# Patient Record
Sex: MALE | Race: BLACK OR AFRICAN AMERICAN | NOT HISPANIC OR LATINO | URBAN - METROPOLITAN AREA
[De-identification: names, ages, dates, MRNs, and addresses within clinical notes are randomized per-mention and may not be internally consistent; named-entity substitution may affect disease eponyms.]

---

## 2020-12-18 ENCOUNTER — HOSPITAL ENCOUNTER (OUTPATIENT)
Dept: LAB | Facility: MEDICAL CENTER | Age: 55
End: 2020-12-18
Payer: COMMERCIAL

## 2020-12-18 LAB
COVID ORDER STATUS COVID19: NORMAL
SARS-COV-2 RNA RESP QL NAA+PROBE: DETECTED
SPECIMEN SOURCE: ABNORMAL

## 2021-01-13 ENCOUNTER — HOSPITAL ENCOUNTER (OUTPATIENT)
Dept: LAB | Facility: MEDICAL CENTER | Age: 56
End: 2021-01-13
Payer: COMMERCIAL

## 2021-01-13 LAB
COVID ORDER STATUS COVID19: NORMAL
SARS-COV-2 RNA RESP QL NAA+PROBE: NOTDETECTED
SPECIMEN SOURCE: NORMAL

## 2022-01-05 ENCOUNTER — OFFICE VISIT (OUTPATIENT)
Dept: URGENT CARE | Facility: CLINIC | Age: 57
End: 2022-01-05
Payer: COMMERCIAL

## 2022-01-05 ENCOUNTER — HOSPITAL ENCOUNTER (OUTPATIENT)
Facility: MEDICAL CENTER | Age: 57
End: 2022-01-05
Attending: NURSE PRACTITIONER
Payer: COMMERCIAL

## 2022-01-05 VITALS
BODY MASS INDEX: 28.49 KG/M2 | OXYGEN SATURATION: 97 % | TEMPERATURE: 97.5 F | RESPIRATION RATE: 14 BRPM | DIASTOLIC BLOOD PRESSURE: 76 MMHG | HEART RATE: 87 BPM | SYSTOLIC BLOOD PRESSURE: 118 MMHG | HEIGHT: 74 IN | WEIGHT: 222 LBS

## 2022-01-05 DIAGNOSIS — J03.90 TONSILLITIS: ICD-10-CM

## 2022-01-05 DIAGNOSIS — Z86.16 HISTORY OF COVID-19: ICD-10-CM

## 2022-01-05 DIAGNOSIS — J02.9 PHARYNGITIS, UNSPECIFIED ETIOLOGY: ICD-10-CM

## 2022-01-05 LAB
INT CON NEG: NEGATIVE
INT CON POS: POSITIVE
S PYO AG THROAT QL: NEGATIVE

## 2022-01-05 PROCEDURE — 87880 STREP A ASSAY W/OPTIC: CPT | Performed by: NURSE PRACTITIONER

## 2022-01-05 PROCEDURE — 99203 OFFICE O/P NEW LOW 30 MIN: CPT | Performed by: NURSE PRACTITIONER

## 2022-01-05 PROCEDURE — 87070 CULTURE OTHR SPECIMN AEROBIC: CPT

## 2022-01-05 RX ORDER — AMOXICILLIN 500 MG/1
500 CAPSULE ORAL 2 TIMES DAILY
Qty: 20 CAPSULE | Refills: 0 | Status: SHIPPED | OUTPATIENT
Start: 2022-01-05 | End: 2022-01-15

## 2022-01-05 ASSESSMENT — ENCOUNTER SYMPTOMS
DIZZINESS: 0
SWOLLEN GLANDS: 1
NAUSEA: 0
HOARSE VOICE: 0
CHILLS: 1
FEVER: 0
COUGH: 0
EYE PAIN: 0
MYALGIAS: 0
VOMITING: 0
SORE THROAT: 1
SHORTNESS OF BREATH: 0
HEADACHES: 0

## 2022-01-06 DIAGNOSIS — J02.9 PHARYNGITIS, UNSPECIFIED ETIOLOGY: ICD-10-CM

## 2022-01-06 NOTE — PROGRESS NOTES
Subjective:   Damian Sellers is a 56 y.o. male who presents for Sore Throat (x 2 days with headache.  Denies fecver or chills.  Covid 19 vaccinated no booster.   Had a Positive Self test 2 weeks ago. He has had Covid twice. )      Pharyngitis   This is a new problem. Episode onset: 2 days; did test positive COVID 2 weeks ago. The problem has been gradually worsening. Neither side of throat is experiencing more pain than the other. There has been no fever. The fever has been present for less than 1 day. The pain is at a severity of 7/10. The pain is moderate. Associated symptoms include swollen glands. Pertinent negatives include no congestion, coughing, ear discharge, ear pain, headaches, hoarse voice, plugged ear sensation, shortness of breath or vomiting. He has had no exposure to strep or mono. He has tried acetaminophen for the symptoms. The treatment provided no relief.       Review of Systems   Constitutional: Positive for chills and malaise/fatigue. Negative for fever.   HENT: Positive for sore throat. Negative for congestion, ear discharge, ear pain and hoarse voice.    Eyes: Negative for pain.   Respiratory: Negative for cough and shortness of breath.    Cardiovascular: Negative for chest pain.   Gastrointestinal: Negative for nausea and vomiting.   Genitourinary: Negative for hematuria.   Musculoskeletal: Negative for myalgias.   Skin: Negative for rash.   Neurological: Negative for dizziness and headaches.       Medications:    • amoxicillin Caps    Allergies: Patient has no known allergies.    Problem List: Damian Sellers does not have a problem list on file.    Surgical History:  No past surgical history on file.    Past Social Hx: Damian Sellers  reports that he has never smoked. He has never used smokeless tobacco. He reports that he does not use drugs.     Past Family Hx:  Damian Sellers family history is not on file.     Problem list, medications, and allergies reviewed by myself today  "in Epic.     Objective:     /76   Pulse 87   Temp 36.4 °C (97.5 °F) (Temporal)   Resp 14   Ht 1.88 m (6' 2\")   Wt 101 kg (222 lb)   SpO2 97%   BMI 28.50 kg/m²     Physical Exam  Vitals and nursing note reviewed.   Constitutional:       General: He is not in acute distress.     Appearance: He is well-developed.   HENT:      Head: Normocephalic and atraumatic.      Right Ear: Tympanic membrane and external ear normal.      Left Ear: Tympanic membrane and external ear normal.      Nose: Nose normal.      Right Sinus: No maxillary sinus tenderness or frontal sinus tenderness.      Left Sinus: No maxillary sinus tenderness or frontal sinus tenderness.      Mouth/Throat:      Mouth: Mucous membranes are moist.      Pharynx: Uvula midline. Posterior oropharyngeal erythema present.      Tonsils: No tonsillar exudate or tonsillar abscesses. 1+ on the right. 1+ on the left.   Eyes:      General:         Right eye: No discharge.         Left eye: No discharge.      Conjunctiva/sclera: Conjunctivae normal.   Cardiovascular:      Rate and Rhythm: Normal rate.   Pulmonary:      Effort: Pulmonary effort is normal. No respiratory distress.      Breath sounds: Normal breath sounds.   Abdominal:      General: There is no distension.   Musculoskeletal:         General: Normal range of motion.   Lymphadenopathy:      Cervical: Cervical adenopathy present.   Skin:     General: Skin is warm and dry.   Neurological:      General: No focal deficit present.      Mental Status: He is alert and oriented to person, place, and time. Mental status is at baseline.      Gait: Gait (gait at baseline) normal.   Psychiatric:         Judgment: Judgment normal.         Assessment/Plan:     Diagnosis and associated orders:     1. Pharyngitis, unspecified etiology  POCT Rapid Strep A    CULTURE THROAT    amoxicillin (AMOXIL) 500 MG Cap   2. Tonsillitis     3. History of COVID-19          Comments/MDM:     • Strep negative    Patient is a " 56-year-old male present with the stated above, evidently patient did test +2 weeks ago for COVID-19 however developed a sore throat, oropharynx is erythematous does have adenopathy, discussed viral versus bacterial patient requesting to be treated empirically on oral antibiotics.  We will follow-up with throat culture and change as indicated.  Advised to continue supportive care with Tylenol and/or ibuprofen for fevers and discomfort. Increased fluids and electrolytes.   Differential diagnosis, natural history, supportive care, and indications for immediate follow-up discussed.               Please note that this dictation was created using voice recognition software. I have made a reasonable attempt to correct obvious errors, but I expect that there are errors of grammar and possibly content that I did not discover before finalizing the note.    This note was electronically signed by Rafat MENDEZ.

## 2022-01-08 LAB
BACTERIA SPEC RESP CULT: NORMAL
SIGNIFICANT IND 70042: NORMAL
SITE SITE: NORMAL
SOURCE SOURCE: NORMAL

## 2022-02-22 ENCOUNTER — OCCUPATIONAL MEDICINE (OUTPATIENT)
Dept: URGENT CARE | Facility: CLINIC | Age: 57
End: 2022-02-22
Payer: COMMERCIAL

## 2022-02-22 VITALS
HEART RATE: 65 BPM | WEIGHT: 223 LBS | DIASTOLIC BLOOD PRESSURE: 70 MMHG | SYSTOLIC BLOOD PRESSURE: 130 MMHG | OXYGEN SATURATION: 100 % | RESPIRATION RATE: 16 BRPM | BODY MASS INDEX: 28.62 KG/M2 | HEIGHT: 74 IN | TEMPERATURE: 97.3 F

## 2022-02-22 DIAGNOSIS — S20.221A BACK CONTUSION, RIGHT, INITIAL ENCOUNTER: ICD-10-CM

## 2022-02-22 DIAGNOSIS — S76.911A MUSCLE STRAIN OF RIGHT THIGH, INITIAL ENCOUNTER: ICD-10-CM

## 2022-02-22 LAB
BREATH ALCOHOL COMMENT: NORMAL
POC BREATHALIZER: 0 PERCENT (ref 0–0.01)

## 2022-02-22 PROCEDURE — 99213 OFFICE O/P EST LOW 20 MIN: CPT | Performed by: FAMILY MEDICINE

## 2022-02-22 PROCEDURE — 80305 DRUG TEST PRSMV DIR OPT OBS: CPT | Performed by: FAMILY MEDICINE

## 2022-02-22 PROCEDURE — 82075 ASSAY OF BREATH ETHANOL: CPT | Performed by: FAMILY MEDICINE

## 2022-02-22 RX ORDER — ACYCLOVIR 400 MG/1
400 TABLET ORAL
COMMUNITY
Start: 2022-01-17

## 2022-02-22 NOTE — LETTER
[facility] [place] [id number] [facility] [address] [address]  Phone:  [phone] - Fax:  [phone]   [facility] Progress Report and Disability Certification  Date of Service: 2/22/2022   No Show:  No  Date / Time of Next Visit:  2/25/22 11:00AM   Claim Information   Patient Name: Damian Sellers  Claim Number:     Employer: [employer]  Date of Injury: 2/22/2022     Insurer / TPA: [facility]  ID / SSN:     Occupation: Technician   Diagnosis: Diagnoses of Back contusion, right, initial encounter and Muscle strain of right thigh, initial encounter were pertinent to this visit.    Medical Information   Related to Industrial Injury? 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    No head trauma or LOC.   Now c/o minor rt upper back pain and rt inner thigh pain.   Denies pain with taking deep breath.             Pertinent negatives include no fever, hematuria, loss of consciousness, tingling or visual change. Pt has tried nothing for the symptoms.        Objective Findings: Pulmonary/Chest: Effort normal and breath sounds normal. No respiratory distress. Pt has no wheezes.   Ribs - there is some minor TTP over rt posterior ribs, but no bruising or swelling or abrasions.   Musculoskeletal:   Rt shoulder - no TTP.   Full AROM  c-spine:   Full AROM.  No TTP  Thoracic spine - there is some minor TTP over rt paraspinal muscles, but no bruising or swelling  Rt leg - there is some minor TTP over adductor muscles.      Pre-Existing Condition(s):     Assessment:   Initial Visit    Status: Additional Care Required  Permanent Disability:No    Plan: Medication    Diagnostics:      Comments:       Disability Information   Status:      From:     Through:   Restrictions are: Temporary   Physical Restrictions   Sitting:    Standing:    Stooping:    Bending:      Squatting:    Walking:     Climbing:    Pushing:      Pulling:    Other:    Reaching Above Shoulder (L):   Reaching Above Shoulder (R):       Reaching Below Shoulder (L):    Reaching Below Shoulder (R):      Not to exceed Weight Limits   Carrying(hrs):   Weight Limit(lb): < or = to 10 pounds Lifting(hrs):   Weight  Limit(lb): < or = to 10 pounds   Comments: 1. Back contusion, right, initial encounter  2. Muscle strain of right thigh, initial encounter     Restrictions per D39  F/u 2-3 d    - diclofenac sodium (VOLTAREN) 1 % Gel; Apply 4 g topically every 8 hours as needed.  Dispense: 100 g; Refill: 0      Repetitive Actions   Hands: i.e. Fine Manipulations from Grasping:     Feet: i.e. Operating Foot Controls:     Driving / Operate Machinery:     Health Care Provider’s Original or Electronic Signature  William Stubbs M.D. Health Care Provider’s Original or Electronic Signature    Lane Munroe MD         Clinic Name / Location: [address] [place] [address] Clinic Phone Number: Dept: [phone]   Appointment Time: 6:15 Pm Visit Start Time: 6:58 PM   Check-In Time:  6:51 Pm Visit Discharge Time:     Original-Treating Physician or Chiropractor    Page 2-Insurer/TPA    Page 3-Employer    Page 4-Employee

## 2022-02-22 NOTE — LETTER
"EMPLOYEE’S CLAIM FOR COMPENSATION/ REPORT OF INITIAL TREATMENT  FORM C-4    EMPLOYEE’S CLAIM - PROVIDE ALL INFORMATION REQUESTED   First Name  Damian Last Name  Verito Birthdate                    1965                Sex  male Claim Number (Insurer’s Use Only)    Home Address  [address] [place] Age  56 y.o. 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 (if applicable)      How did this injury or occupational disease occur? (Be specific an answer in detail. Use additional sheet if necessary)     If you believe that you have an occupational disease, when did you first have knowledge of the disability and it relationship to your employment?   Witnesses to the Accident        Nature of Injury or Occupational Disease  Sprain  Part(s) of Body Injured or Affected  Shoulder (R), ,     I certify that the above is true and correct to the best of my knowledge and that I have provided this information in order to obtain the benefits of [place]  I hereby authorize " any physician, chiropractor, surgeon, practitioner, or other person, any hospital, including [facility] or [facility], any medical service organization, any insurance company, or other institution or organization to release to each other, any medical or other information, including benefits paid or payable, pertinent to this injury or disease, except information relative to diagnosis, treatment and/or counseling for AIDS, psychological conditions, alcohol or controlled substances, for which I must give specific authorization.  A Photostat of this authorization shall be as valid as the original.     Date   Place Employee’s Original or  *Electronic Signature   THIS REPORT MUST BE COMPLETED AND MAILED WITHIN 3 WORKING DAYS OF TREATMENT   Place  [facility]  Name of Facility  [facility]   Date  2/22/2022 Diagnosis and Description of Injury or Occupational Disease  (S20.221A) Back contusion, right, initial encounter  (S76.911A) Muscle strain of right thigh, initial encounter Is there evidence the injured employee was under the influence of alcohol and/or another controlled substance at the time of accident?  ? No ? Yes (if yes, please explain)    Hour  6:58 PM   Diagnoses of Back contusion, right, initial encounter and Muscle strain of right thigh, initial encounter were pertinent to this visit. No   Treatment  1. Back contusion, right, initial encounter  2. Muscle strain of right thigh, initial encounter     Restrictions per D39  F/u 2-3 d    - diclofenac sodium (VOLTAREN) 1 % Gel; Apply 4 g topically every 8 hours as needed.  Dispense: 100 g; Refill: 0    Have you advised the patient to remain off work five days or     more?    X-Ray Findings      ? Yes Indicate dates:   From   To      From information given by the employee, together with medical evidence, can        you directly connect this injury or occupational disease as job incurred?  Yes ? No If no, is the injured employee capable  "of:  ? full duty  No ? modified duty  Yes   Is additional medical care by a physician indicated?  Yes If Modified Duty, Specify any Limitations / Restrictions  Restrictions per D39     Do you know of any previous injury or disease contributing to this condition or occupational disease?  ? Yes ? No (Explain if yes)                          No   Date  2/22/2022 Print Health Care Provider's   William Santiago M.D. I certify the employer’s copy of  this form was mailed on:   Address  [address] [place] [address] [address] Insurer’s Use Only     [facility] Zip  [address]    Provider’s Tax ID Number  [id number] Telephone  Dept: [phone]             Health Care Provider’s Original or Electronic Signature  e-SignWILLIAM SANTIAGO M.D. Degree (MD,DO, DC,PA-C,APRN)   MD      * Complete and attach Release of Information (Form C-4A) when injured employee signs C-4 Form electronically  ORIGINAL - TREATING HEALTHCARE PROVIDER PAGE 2 - INSURER/TPA PAGE 3 - EMPLOYER PAGE 4 - EMPLOYEE             Form C-4 (rev.08/21)           BRIEF DESCRIPTION OF RIGHTS AND BENEFITS  (Pursuant to [place] 616[place])    Notice of Injury or Occupational Disease (Incident Report Form C-1): If an injury or occupational disease (OD) arises out of and in the course of employment, you must provide written notice to your employer as soon as practicable, but no later than 7 days after the accident or OD. Your employer shall maintain a sufficient supply of the required forms.    Claim for Compensation (Form C-4): If medical treatment is sought, the form C-4 is available at the place of initial treatment. A completed \"Claim for Compensation\" (Form C-4) must be filed within 90 days after an accident or OD. The treating physician or chiropractor must, within 3 working days after treatment, complete and mail to the employer, the employer's insurer and third-party , the Claim for Compensation.    Medical Treatment: If you require medical treatment for your " on-the-job injury or OD, you may be required to select a physician or chiropractor from a list provided by your workers’ compensation insurer, if it has contracted with an Organization for Managed Care (MCO) or Preferred Provider Organization (PPO) or providers of health care. If your employer has not entered into a contract with an MCO or PPO, you may select a physician or chiropractor from the Panel of Physicians and Chiropractors. Any medical costs related to your industrial injury or OD will be paid by your insurer.    Temporary Total Disability (TTD): If your doctor has certified that you are unable to work for a period of at least 5 consecutive days, or 5 cumulative days in a 20-day period, or places restrictions on you that your employer does not accommodate, you may be entitled to TTD compensation.    Temporary Partial Disability (TPD): If the wage you receive upon reemployment is less than the compensation for TTD to which you are entitled, the insurer may be required to pay you TPD compensation to make up the difference. TPD can only be paid for a maximum of 24 months.    Permanent Partial Disability (PPD): When your medical condition is stable and there is an indication of a PPD as a result of your injury or OD, within 30 days, your insurer must arrange for an evaluation by a rating physician or chiropractor to determine the degree of your PPD. The amount of your PPD award depends on the date of injury, the results of the PPD evaluation, your age and wage.    Permanent Total Disability (PTD): If you are medically certified by a treating physician or chiropractor as permanently and totally disabled and have been granted a PTD status by your insurer, you are entitled to receive monthly benefits not to exceed 66 2/3% of your average monthly wage. The amount of your PTD payments is subject to reduction if you previously received a lump-sum PPD award.    Vocational Rehabilitation Services: You may be eligible  for vocational rehabilitation services if you are unable to return to the job due to a permanent physical impairment or permanent restrictions as a result of your injury or occupational disease.    Transportation and Per Julius Reimbursement: You may be eligible for travel expenses and per julius associated with medical treatment.    Reopening: You may be able to reopen your claim if your condition worsens after claim closure.     Appeal Process: If you disagree with a written determination issued by the insurer or the insurer does not respond to your request, you may appeal to the Department of Administration, , by following the instructions contained in your determination letter. You must appeal the determination within 70 days from the date of the determination letter at [address], [address], [place], [place] [address], or [address], [place], [place] [address]. If you disagree with the  decision, you may appeal to the Department of Administration, . You must file your appeal within 30 days from the date of the  decision letter at [address], [address], [place], [place] [address], or [address], [address], [place], [place] [address]. If you disagree with a decision of an , you may file a petition for judicial review with the District Court. You must do so within 30 days of the Appeal Officer’s decision. You may be represented by an  at your own expense or you may contact the [facility] for possible representation.    [place]  for Injured Workers (NAIW): If you disagree with a  decision, you may request that NAIW represent you without charge at an  Hearing. For information regarding denial of benefits, you may contact the [facility] at: [address], [address], [place] [address], [phone], or [address], [address], [place] [address],  [phone]    To File a Complaint with the Division: If you wish to file a complaint with the  of the Division of Industrial Relations (DIR),  please contact the Workers’ Compensation Section, [address], [address], [place], [place] [address], telephone [phone], or [address], [place], [place] [address], telephone [phone].    For assistance with Workers’ Compensation Issues: You may contact the [facility] Office for Consumer Health Assistance, [address], [place], [place] [address], Toll Free [phone], Web site: http://[facility].[place].gov/Programs/BINA E-mail: bina@[facility].nv.[facility]              __________________________________________________________________                                    _________________            Employee Name / Signature                                                                                                                            Date                                                                                                                                                                                                                              D-2 (rev. 10/20)

## 2022-02-23 NOTE — PROGRESS NOTES
"Chief Complaint   Patient presents with   • Work-Related Injury     WC DOI: 02/22/22 (L) leg (R) shoulder. Fall.           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    No head trauma or LOC.   Now c/o minor rt upper back pain and rt inner thigh pain.   Denies pain with taking deep breath.             Pertinent negatives include no fever, hematuria, loss of consciousness, tingling or visual change. Pt has tried nothing for the symptoms.           Social History     Tobacco Use   • Smoking status: Never Smoker   • Smokeless tobacco: Never Used   Vaping Use   • Vaping Use: Never used   Substance Use Topics   • Drug use: Never         No past medical history on file.      Current Outpatient Medications on File Prior to Visit   Medication Sig Dispense Refill   • acyclovir (ZOVIRAX) 400 MG tablet Take 400 mg by mouth every day.       No current facility-administered medications on file prior to visit.              Review of Systems   Constitutional: Negative for fever.   Genitourinary: Negative for hematuria.   Neurological: Negative for tingling and loss of consciousness.          Objective:     /70   Pulse 65   Temp 36.3 °C (97.3 °F)   Resp 16   Ht 1.88 m (6' 2\")   Wt 101 kg (223 lb)   SpO2 100%     Physical Exam   Constitutional: She is oriented to person, place, and time. Pt appears well-developed and well-nourished. No distress.   HENT:   Head: Normocephalic and atraumatic.   Eyes: Conjunctivae are normal.   Cardiovascular: Normal rate, regular rhythm and normal heart sounds.    Pulmonary/Chest: Effort normal and breath sounds normal. No respiratory distress. Pt has no wheezes.   Ribs - there is some minor TTP over rt posterior ribs, but no bruising or swelling or abrasions.   Musculoskeletal:   Rt shoulder - no TTP.   Full AROM  c-spine:   Full AROM.  No TTP  Thoracic spine - there is some minor TTP over rt paraspinal muscles, but no " bruising or swelling  Rt leg - there is some minor TTP over adductor muscles.       Neurological: pt is alert and oriented to person, place, and time. No cranial nerve deficit.   Skin: Skin is warm. Pt is not diaphoretic. No erythema.   Nursing note and vitals reviewed.              Assessment/Plan:          1. Back contusion, right, initial encounter  2. Muscle strain of right thigh, initial encounter     Restrictions per D39  F/u 2-3 d    - diclofenac sodium (VOLTAREN) 1 % Gel; Apply 4 g topically every 8 hours as needed.  Dispense: 100 g; Refill: 0

## 2022-02-25 ENCOUNTER — OCCUPATIONAL MEDICINE (OUTPATIENT)
Dept: URGENT CARE | Facility: CLINIC | Age: 57
End: 2022-02-25
Payer: COMMERCIAL

## 2022-02-25 VITALS
WEIGHT: 225.6 LBS | HEART RATE: 68 BPM | RESPIRATION RATE: 12 BRPM | DIASTOLIC BLOOD PRESSURE: 60 MMHG | TEMPERATURE: 97.6 F | SYSTOLIC BLOOD PRESSURE: 104 MMHG | OXYGEN SATURATION: 99 % | BODY MASS INDEX: 28.95 KG/M2 | HEIGHT: 74 IN

## 2022-02-25 DIAGNOSIS — S20.221D BACK CONTUSION, RIGHT, SUBSEQUENT ENCOUNTER: ICD-10-CM

## 2022-02-25 DIAGNOSIS — S76.911D: ICD-10-CM

## 2022-02-25 PROCEDURE — 99213 OFFICE O/P EST LOW 20 MIN: CPT | Performed by: PHYSICIAN ASSISTANT

## 2022-02-25 NOTE — PROGRESS NOTES
"Subjective:     Damian Sellers is a 56 y.o. male who presents for Other (WC FV DOI: 2/22/22 (R) shoulder, (R) side has gotten better)      DOI: 2/22/2022 -Patient describes a slip and fall while at work landing on right side.  Complains of discomfort to right shoulder right low back and right medial thigh.  Patient reports all areas of discomfort are nearly completely resolved.  Would like to return to full duty prior to closing case.  He feels as though he he could have been much more injured in the fall he had and is encouraged she is improving so rapidly.  He has been using Voltaren gel which is helped significantly.    PMH:   No pertinent past medical history to this problem  MEDS:  Medications were reviewed in EMR  ALLERGIES:  Allergies were reviewed in EMR  FH:   No pertinent family history to this problem       Objective:     /60 (BP Location: Left arm, Patient Position: Sitting, BP Cuff Size: Large adult)   Pulse 68   Temp 36.4 °C (97.6 °F) (Temporal)   Resp 12   Ht 1.88 m (6' 2.02\")   Wt 102 kg (225 lb 9.6 oz)   SpO2 99%   BMI 28.95 kg/m²     Gen: AOx3; Head: NC AT; Eyes: PERRLA/EOM; Lungs: NLR; Cardiac: RR by periph pulse exam; right flank area: Grossly normal no erythema ecchymosis or edema, tenderness to palpation over paraspinal musculature, no bony pain; right shoulder: Prior area of tenderness to palpation over posterior shoulder-significantly improved today, full active range of motion, no bony tenderness to palpation, rotator cuff strength is full; right thigh: No erythema, trace palpable edema, full normal strength; neuro: N VID, nonantalgic gait    Assessment/Plan:       1. Back contusion, right, subsequent encounter    2. Muscle strain of thigh, right, subsequent encounter    • Released to Full Duty FROM 2/25/2022 TO 3/7/2022  • Full duty now, OTC NSAIDs, Voltaren gel as needed, follow-up 1 week from this coming Monday for likely MMI   •      Differential diagnosis, natural " history, supportive care, and indications for immediate follow-up discussed.

## 2022-02-25 NOTE — LETTER
[facility] [place] [address] [facility] [address] [address]  Phone:  [phone] - Fax:  [phone]   [facility] Progress Report and Disability Certification  Date of Service: 2/25/2022   No Show:  No  Date / Time of Next Visit: 3/7/2022 at 11:30AM   Claim Information   Patient Name: Damian Sellers  Claim Number:     Employer: [employer]  Date of Injury: 2/22/2022     Insurer / TPA: [facility]  ID / SSN:     Occupation: Technician   Diagnosis: Diagnoses of Back contusion, right, subsequent encounter and Muscle strain of thigh, right, subsequent encounter were pertinent to this visit.    Medical Information   Related to Industrial Injury? Yes    Subjective Complaints:  DOI: 2/22/2022 -Patient describes a slip and fall while at work landing on right side.  Complains of discomfort to right shoulder right low back and right medial thigh.  Patient reports all areas of discomfort are nearly completely resolved.  Would like to return to full duty prior to closing case.  He feels as though he he could have been much more injured in the fall he had and is encouraged she is improving so rapidly.  He has been using Voltaren gel which is helped significantly.   Objective Findings: Gen: AOx3; Head: NC AT; Eyes: PERRLA/EOM; Lungs: NLR; Cardiac: RR by periph pulse exam; right flank area: Grossly normal no erythema ecchymosis or edema, tenderness to palpation over paraspinal musculature, no bony pain; right shoulder: Prior area of tenderness to palpation over posterior shoulder-significantly improved today, full active range of motion, no bony tenderness to palpation, rotator cuff strength is full; right thigh: No erythema, trace palpable edema, full normal strength; neuro: N VID, nonantalgic gait   Pre-Existing Condition(s):     Assessment:   Condition Improved    Status: Additional Care Required  Permanent Disability:No    Plan:   Comments:Full duty now, OTC NSAIDs, Voltaren gel  as needed, follow-up 1 week from this coming Monday for likely MMI      Diagnostics:      Comments:       Disability Information   Status: Released to Full Duty    From:  2/25/2022  Through: 3/7/2022 Restrictions are:     Physical Restrictions   Sitting:    Standing:    Stooping:    Bending:      Squatting:    Walking:    Climbing:    Pushing:      Pulling:    Other:    Reaching Above Shoulder (L):   Reaching Above Shoulder (R):       Reaching Below Shoulder (L):    Reaching Below Shoulder (R):      Not to exceed Weight Limits   Carrying(hrs):   Weight Limit(lb):   Lifting(hrs):   Weight  Limit(lb):     Comments: Full duty now, OTC NSAIDs, Voltaren gel as needed, follow-up 1 week from this coming Monday for likely MMI     Repetitive Actions   Hands: i.e. Fine Manipulations from Grasping:     Feet: i.e. Operating Foot Controls:     Driving / Operate Machinery:     Health Care Provider’s Original or Electronic Signature  Cam Clayton P.A.-C. Health Care Provider’s Original or Electronic Signature    Lane Munroe MD         Clinic Name / Location: [address] [address] Clinic Phone Number: Dept: [phone]   Appointment Time: 11:00 Am Visit Start Time: 11:14 AM   Check-In Time:  10:46 Am Visit Discharge Time:  12:01PM   Original-Treating Physician or Chiropractor    Page 2-Insurer/TPA    Page 3-Employer    Page 4-Employee

## 2022-03-07 ENCOUNTER — APPOINTMENT (OUTPATIENT)
Dept: URGENT CARE | Facility: CLINIC | Age: 57
End: 2022-03-07
Payer: COMMERCIAL

## 2022-03-14 ENCOUNTER — OCCUPATIONAL MEDICINE (OUTPATIENT)
Dept: URGENT CARE | Facility: CLINIC | Age: 57
End: 2022-03-14
Payer: COMMERCIAL

## 2022-03-14 VITALS
RESPIRATION RATE: 12 BRPM | SYSTOLIC BLOOD PRESSURE: 132 MMHG | TEMPERATURE: 97.9 F | HEART RATE: 53 BPM | BODY MASS INDEX: 28.88 KG/M2 | DIASTOLIC BLOOD PRESSURE: 78 MMHG | HEIGHT: 74 IN | WEIGHT: 225 LBS | OXYGEN SATURATION: 98 %

## 2022-03-14 DIAGNOSIS — S20.221D BACK CONTUSION, RIGHT, SUBSEQUENT ENCOUNTER: ICD-10-CM

## 2022-03-14 DIAGNOSIS — S76.911D: ICD-10-CM

## 2022-03-14 PROCEDURE — 99213 OFFICE O/P EST LOW 20 MIN: CPT | Performed by: PHYSICIAN ASSISTANT

## 2022-03-14 ASSESSMENT — ENCOUNTER SYMPTOMS
MYALGIAS: 0
BACK PAIN: 0
NECK PAIN: 0

## 2022-03-14 NOTE — PROGRESS NOTES
"Subjective:   Damian Sellers is a 56 y.o. male who presents for Follow-Up ( FV 2/22/22 (R) Shoulder/ R abdominal [employer]/ [facility] [employer], health has been improved and has no issues)       Date of Injury:  2/22/2022. Patient presents for follow-up appointment regarding prior back and right shoulder pain.  He was awaiting discharge following trial of full duties.  He is tolerating his normal work duties well.  No recurrence of symptoms since his last evaluation.  He is not requiring use of NSAIDs or topical analgesics to control symptoms.  No new concerns today.  No nausea, vomiting, fevers, chills, loss of bowel or bladder control, saddle dysesthesias, lower extremity weakness.        Review of Systems   Musculoskeletal: Negative for back pain, myalgias and neck pain.       PMH:  has no past medical history on file.  MEDS:   Current Outpatient Medications:   •  acyclovir (ZOVIRAX) 400 MG tablet, Take 400 mg by mouth every day., Disp: , Rfl:   •  diclofenac sodium (VOLTAREN) 1 % Gel, Apply 4 g topically every 8 hours as needed. (Patient not taking: Reported on 3/14/2022), Disp: 100 g, Rfl: 0  ALLERGIES: No Known Allergies  SURGHX: No past surgical history on file.  SOCHX:  reports that he has never smoked. He has never used smokeless tobacco. He reports previous alcohol use. He reports that he does not use drugs.  FH: Family history was reviewed, no pertinent findings to report   Objective:   /78 (BP Location: Left arm, Patient Position: Sitting, BP Cuff Size: Adult)   Pulse (!) 53   Temp 36.6 °C (97.9 °F) (Temporal)   Resp 12   Ht 1.88 m (6' 2\")   Wt 102 kg (225 lb)   SpO2 98%   BMI 28.89 kg/m²   Physical Exam  Vitals reviewed.   Constitutional:       General: He is not in acute distress.     Appearance: Normal appearance. He is well-developed. He is not toxic-appearing.   HENT:      Head: Normocephalic and atraumatic.      Right Ear: External ear normal.      Left Ear: External ear " normal.      Nose: Nose normal.   Cardiovascular:      Rate and Rhythm: Normal rate and regular rhythm.   Pulmonary:      Effort: Pulmonary effort is normal. No respiratory distress.      Breath sounds: No stridor.   Skin:     General: Skin is dry.   Neurological:      Comments: Alert and oriented.    Psychiatric:         Speech: Speech normal.         Behavior: Behavior normal.          Assessment/Plan:   1. Muscle strain of thigh, right, subsequent encounter    2. Back contusion, right, subsequent encounter    Patient discharged/MMI.  Return precautions reviewed.    Differential diagnosis, natural history, supportive care, and indications for immediate follow-up discussed.

## 2022-03-14 NOTE — LETTER
[facility] [place] [address] [facility] [address] [address]  Phone:  [phone] - Fax:  [phone]   [facility] Progress Report and Disability Certification  Date of Service: 3/14/2022   No Show:  No  Date / Time of Next Visit:     Claim Information   Patient Name: Damian Sellers  Claim Number:     Employer: [employer]  Date of Injury: 2/22/2022     Insurer / TPA: [facility] [employer]  ID / SSN:     Occupation: Technician   Diagnosis: Diagnoses of Muscle strain of thigh, right, subsequent encounter and Back contusion, right, subsequent encounter were pertinent to this visit.    Medical Information   Related to Industrial Injury? Yes    Subjective Complaints:  Date of Injury:  2/22/2022. Patient presents for follow-up appointment regarding prior back and right shoulder pain.  He was awaiting discharge following trial of full duties.  He is tolerating his normal work duties well.  No recurrence of symptoms since his last evaluation.  He is not requiring use of NSAIDs or topical analgesics to control symptoms.  No new concerns today.  No nausea, vomiting, fevers, chills, loss of bowel or bladder control, saddle dysesthesias, lower extremity weakness.   Objective Findings: Constitutional:       General: He is not in acute distress.     Appearance: Normal appearance. He is well-developed. He is not toxic-appearing.   HENT:      Head: Normocephalic and atraumatic.      Right Ear: External ear normal.      Left Ear: External ear normal.      Nose: Nose normal.   Cardiovascular:      Rate and Rhythm: Normal rate and regular rhythm.   Pulmonary:      Effort: Pulmonary effort is normal. No respiratory distress.      Breath sounds: No stridor.   Skin:     General: Skin is dry.   Neurological:      Comments: Alert and oriented.     Pre-Existing Condition(s):     Assessment:   Condition Improved    Status: Discharged /  MMI  Permanent Disability:No    Plan:      Diagnostics:       Comments:  Return precautions reviewed.    Disability Information   Status: Released to Full Duty    From:     Through:   Restrictions are:     Physical Restrictions   Sitting:    Standing:    Stooping:    Bending:      Squatting:    Walking:    Climbing:    Pushing:      Pulling:    Other:    Reaching Above Shoulder (L):   Reaching Above Shoulder (R):       Reaching Below Shoulder (L):    Reaching Below Shoulder (R):      Not to exceed Weight Limits   Carrying(hrs):   Weight Limit(lb):   Lifting(hrs):   Weight  Limit(lb):     Comments:      Repetitive Actions   Hands: i.e. Fine Manipulations from Grasping:     Feet: i.e. Operating Foot Controls:     Driving / Operate Machinery:     Health Care Provider’s Original or Electronic Signature  Roque Poe P.A.-C. Health Care Provider’s Original or Electronic Signature    Lane Munroe MD         Clinic Name / Location: [address] [address] Clinic Phone Number: Dept: [phone]   Appointment Time: 8:30 Am Visit Start Time: 8:31 AM   Check-In Time:  8:28 Am Visit Discharge Time:  9:25AM   Original-Treating Physician or Chiropractor    Page 2-Insurer/TPA    Page 3-Employer    Page 4-Employee

## 2022-08-28 ENCOUNTER — OFFICE VISIT (OUTPATIENT)
Dept: URGENT CARE | Facility: CLINIC | Age: 57
End: 2022-08-28
Payer: COMMERCIAL

## 2022-08-28 VITALS
HEART RATE: 62 BPM | DIASTOLIC BLOOD PRESSURE: 78 MMHG | SYSTOLIC BLOOD PRESSURE: 118 MMHG | TEMPERATURE: 97.6 F | BODY MASS INDEX: 29 KG/M2 | RESPIRATION RATE: 14 BRPM | WEIGHT: 226 LBS | OXYGEN SATURATION: 100 % | HEIGHT: 74 IN

## 2022-08-28 DIAGNOSIS — K64.8 INTERNAL HEMORRHOID: ICD-10-CM

## 2022-08-28 PROCEDURE — 99213 OFFICE O/P EST LOW 20 MIN: CPT | Performed by: FAMILY MEDICINE

## 2022-08-28 RX ORDER — POLYETHYLENE GLYCOL 3350 17 G/17G
17 POWDER, FOR SOLUTION ORAL DAILY
Qty: 116 G | Refills: 0 | Status: SHIPPED | OUTPATIENT
Start: 2022-08-28

## 2022-08-28 ASSESSMENT — ENCOUNTER SYMPTOMS
ABDOMINAL PAIN: 0
BLOOD IN STOOL: 1
CONSTIPATION: 1
FEVER: 0

## 2022-08-29 NOTE — PROGRESS NOTES
"Subjective:     Damian Sellers is a 57 y.o. male who presents for Other (Blood in stool )    HPI  Pt presents for evaluation of an acute problem  Pt with blood in stool which just started today   Had only a small amount of bright red blood on the tissue when wiping   No pain in the rectal area  Has some chronic constipation   Has not had hemorrhoids phil the past     Review of Systems   Constitutional:  Negative for fever.   Gastrointestinal:  Positive for blood in stool and constipation. Negative for abdominal pain.   Skin:  Negative for rash.     PMH:  has no past medical history on file.  MEDS:   Current Outpatient Medications:     acyclovir (ZOVIRAX) 400 MG tablet, Take 400 mg by mouth every day., Disp: , Rfl:   ALLERGIES: No Known Allergies  SURGHX: History reviewed. No pertinent surgical history.  SOCHX:  reports that he has never smoked. He has never used smokeless tobacco. He reports that he does not currently use alcohol. He reports that he does not use drugs.     Objective:   /78 (BP Location: Right arm, Patient Position: Sitting, BP Cuff Size: Adult long)   Pulse 62   Temp 36.4 °C (97.6 °F) (Temporal)   Resp 14   Ht 1.88 m (6' 2\")   Wt 103 kg (226 lb)   SpO2 100%   BMI 29.02 kg/m²     Physical Exam  Constitutional:       General: He is not in acute distress.     Appearance: He is well-developed. He is not diaphoretic.   Pulmonary:      Effort: Pulmonary effort is normal.   Genitourinary:     Comments: No external hemorrhoid or fissure  Small internal hemorrhoid palpated with no gross blood on exam  Neurological:      Mental Status: He is alert.       Assessment/Plan:   Assessment    1. Internal hemorrhoid  - polyethylene glycol 3350 (MIRALAX) 17 GM/SCOOP Powder; Take 17 g by mouth every day.  Dispense: 116 g; Refill: 0    Patient with small internal hemorrhoid.  No gross blood on exam.  Advised to start using MiraLAX, reviewed other supportive care measures including decrease toilet time " and decrease straining.  Follow-up if not fully resolving.

## 2022-11-04 ENCOUNTER — PATIENT MESSAGE (OUTPATIENT)
Dept: HEALTH INFORMATION MANAGEMENT | Facility: OTHER | Age: 57
End: 2022-11-04

## 2022-12-19 ENCOUNTER — HOSPITAL ENCOUNTER (OUTPATIENT)
Facility: MEDICAL CENTER | Age: 57
End: 2022-12-19
Attending: NURSE PRACTITIONER
Payer: COMMERCIAL

## 2023-01-09 ENCOUNTER — HOSPITAL ENCOUNTER (OUTPATIENT)
Facility: MEDICAL CENTER | Age: 58
End: 2023-01-09
Attending: NURSE PRACTITIONER
Payer: COMMERCIAL

## 2023-01-09 LAB
APPEARANCE UR: CLEAR
BILIRUB UR QL STRIP.AUTO: NEGATIVE
COLOR UR: YELLOW
GLUCOSE UR STRIP.AUTO-MCNC: NEGATIVE MG/DL
KETONES UR STRIP.AUTO-MCNC: ABNORMAL MG/DL
LEUKOCYTE ESTERASE UR QL STRIP.AUTO: NEGATIVE
MICRO URNS: ABNORMAL
NITRITE UR QL STRIP.AUTO: NEGATIVE
PH UR STRIP.AUTO: 5.5 [PH] (ref 5–8)
PROT UR QL STRIP: NEGATIVE MG/DL
RBC UR QL AUTO: NEGATIVE
SP GR UR STRIP.AUTO: 1.02
UROBILINOGEN UR STRIP.AUTO-MCNC: 0.2 MG/DL

## 2023-01-09 PROCEDURE — 81003 URINALYSIS AUTO W/O SCOPE: CPT
